# Patient Record
Sex: FEMALE | Race: WHITE | URBAN - METROPOLITAN AREA
[De-identification: names, ages, dates, MRNs, and addresses within clinical notes are randomized per-mention and may not be internally consistent; named-entity substitution may affect disease eponyms.]

---

## 2020-10-29 ENCOUNTER — OFFICE VISIT (OUTPATIENT)
Dept: URBAN - METROPOLITAN AREA CLINIC 76 | Facility: CLINIC | Age: 60
End: 2020-10-29
Payer: COMMERCIAL

## 2020-10-29 DIAGNOSIS — H10.45 OTHER CHRONIC ALLERGIC CONJUNCTIVITIS: ICD-10-CM

## 2020-10-29 DIAGNOSIS — H52.4 PRESBYOPIA: Primary | ICD-10-CM

## 2020-10-29 DIAGNOSIS — H25.13 AGE-RELATED NUCLEAR CATARACT, BILATERAL: ICD-10-CM

## 2020-10-29 PROCEDURE — 92004 COMPRE OPH EXAM NEW PT 1/>: CPT | Performed by: OPTOMETRIST

## 2020-10-29 ASSESSMENT — KERATOMETRY
OS: 45.25
OD: 44.38

## 2020-10-29 ASSESSMENT — VISUAL ACUITY
OD: 20/20
OS: 20/20

## 2020-10-29 ASSESSMENT — INTRAOCULAR PRESSURE
OD: 17
OS: 17

## 2020-10-29 NOTE — IMPRESSION/PLAN
Impression: Open angle with borderline findings, low risk, bilateral: H40.013. Glaucoma suspect due to cupping. Need updated testing VF, OCT Plan: Rediscussed diagnosis with patient.  1-2 month VF, OCT, IOP check OU

## 2020-12-02 ENCOUNTER — OFFICE VISIT (OUTPATIENT)
Dept: URBAN - METROPOLITAN AREA CLINIC 76 | Facility: CLINIC | Age: 60
End: 2020-12-02
Payer: COMMERCIAL

## 2020-12-02 PROCEDURE — 76514 ECHO EXAM OF EYE THICKNESS: CPT | Performed by: OPTOMETRIST

## 2020-12-02 PROCEDURE — 92083 EXTENDED VISUAL FIELD XM: CPT | Performed by: OPTOMETRIST

## 2020-12-02 PROCEDURE — 92133 CPTRZD OPH DX IMG PST SGM ON: CPT | Performed by: OPTOMETRIST

## 2020-12-02 PROCEDURE — 99213 OFFICE O/P EST LOW 20 MIN: CPT | Performed by: OPTOMETRIST

## 2020-12-02 ASSESSMENT — INTRAOCULAR PRESSURE
OS: 17
OD: 18

## 2020-12-02 NOTE — IMPRESSION/PLAN
Impression: Open angle with borderline findings, low risk, bilateral: H40.013. Bilateral. preformed and reviewed VF and OCT OU. VF normal OU. RNFL OCT normal OU. Thick corneas OU. Plan: Discussed with patient. Continue to monitor without treatment. Recommend glaucoma testing every 2 yrs.

## 2021-11-18 ENCOUNTER — OFFICE VISIT (OUTPATIENT)
Dept: URBAN - METROPOLITAN AREA CLINIC 76 | Facility: CLINIC | Age: 61
End: 2021-11-18
Payer: COMMERCIAL

## 2021-11-18 DIAGNOSIS — H40.013 OPEN ANGLE WITH BORDERLINE FINDINGS, LOW RISK, BILATERAL: ICD-10-CM

## 2021-11-18 PROCEDURE — 92014 COMPRE OPH EXAM EST PT 1/>: CPT | Performed by: OPTOMETRIST

## 2021-11-18 ASSESSMENT — KERATOMETRY
OD: 44.38
OS: 44.75

## 2021-11-18 ASSESSMENT — VISUAL ACUITY
OD: 20/20
OS: 20/20

## 2021-11-18 ASSESSMENT — INTRAOCULAR PRESSURE
OD: 22
OS: 19

## 2021-11-18 NOTE — IMPRESSION/PLAN
Impression: Open angle with borderline findings, low risk, bilateral: H40.013. Bilateral. Thick corneas OU. IOP higher OD today. Plan: Rediscussed with patient. Continue to monitor without treatment.  RTC 6 months VF/OCT

## 2021-11-18 NOTE — IMPRESSION/PLAN
Impression: Age-related nuclear cataract, bilateral: H25.13. Bilateral. Plan: Rediscussed condition. Continue to monitor without treatment at this time.

## 2022-05-11 ENCOUNTER — OFFICE VISIT (OUTPATIENT)
Dept: URBAN - METROPOLITAN AREA CLINIC 76 | Facility: CLINIC | Age: 62
End: 2022-05-11
Payer: COMMERCIAL

## 2022-05-11 DIAGNOSIS — H40.013 OPEN ANGLE WITH BORDERLINE FINDINGS, LOW RISK, BILATERAL: Primary | ICD-10-CM

## 2022-05-11 DIAGNOSIS — H25.13 AGE-RELATED NUCLEAR CATARACT, BILATERAL: ICD-10-CM

## 2022-05-11 PROCEDURE — 92133 CPTRZD OPH DX IMG PST SGM ON: CPT | Performed by: OPTOMETRIST

## 2022-05-11 PROCEDURE — 99213 OFFICE O/P EST LOW 20 MIN: CPT | Performed by: OPTOMETRIST

## 2022-05-11 PROCEDURE — 92083 EXTENDED VISUAL FIELD XM: CPT | Performed by: OPTOMETRIST

## 2022-05-11 ASSESSMENT — INTRAOCULAR PRESSURE
OD: 20
OS: 20

## 2023-05-10 ENCOUNTER — OFFICE VISIT (OUTPATIENT)
Dept: URBAN - METROPOLITAN AREA CLINIC 76 | Facility: CLINIC | Age: 63
End: 2023-05-10
Payer: COMMERCIAL

## 2023-05-10 DIAGNOSIS — H52.4 PRESBYOPIA: ICD-10-CM

## 2023-05-10 DIAGNOSIS — H25.13 AGE-RELATED NUCLEAR CATARACT, BILATERAL: ICD-10-CM

## 2023-05-10 DIAGNOSIS — H04.123 DRY EYE SYNDROME OF BILATERAL LACRIMAL GLANDS: ICD-10-CM

## 2023-05-10 DIAGNOSIS — H40.013 OPEN ANGLE WITH BORDERLINE FINDINGS, LOW RISK, BILATERAL: Primary | ICD-10-CM

## 2023-05-10 PROCEDURE — 99214 OFFICE O/P EST MOD 30 MIN: CPT | Performed by: OPTOMETRIST

## 2023-05-10 ASSESSMENT — INTRAOCULAR PRESSURE
OS: 16
OD: 16

## 2023-05-10 ASSESSMENT — KERATOMETRY
OS: 45.25
OD: 44.50

## 2023-05-10 NOTE — IMPRESSION/PLAN
Impression: Open angle with borderline findings, low risk, bilateral: H40.013. Bilateral. Plan: Intraocular pressure well controlled without medications. Will continue with same regimen.   Testing PRN

## 2023-05-10 NOTE — IMPRESSION/PLAN
Impression: Presbyopia: H52.4 Bilateral. Plan: Discussed diagnosis with patient. No need for glasses at this time.

## 2024-06-03 ENCOUNTER — OFFICE VISIT (OUTPATIENT)
Dept: URBAN - METROPOLITAN AREA CLINIC 76 | Facility: CLINIC | Age: 64
End: 2024-06-03
Payer: COMMERCIAL

## 2024-06-03 DIAGNOSIS — H25.13 AGE-RELATED NUCLEAR CATARACT, BILATERAL: ICD-10-CM

## 2024-06-03 DIAGNOSIS — H40.013 OPEN ANGLE WITH BORDERLINE FINDINGS, LOW RISK, BILATERAL: ICD-10-CM

## 2024-06-03 DIAGNOSIS — H52.4 PRESBYOPIA: Primary | ICD-10-CM

## 2024-06-03 PROCEDURE — 92014 COMPRE OPH EXAM EST PT 1/>: CPT | Performed by: OPTOMETRIST

## 2024-06-03 ASSESSMENT — KERATOMETRY
OS: 44.75
OD: 44.38

## 2024-06-03 ASSESSMENT — INTRAOCULAR PRESSURE
OD: 16
OS: 16

## 2024-06-03 ASSESSMENT — VISUAL ACUITY
OD: 20/20
OS: 20/20

## 2025-06-05 ENCOUNTER — OFFICE VISIT (OUTPATIENT)
Dept: URBAN - METROPOLITAN AREA CLINIC 76 | Facility: CLINIC | Age: 65
End: 2025-06-05
Payer: COMMERCIAL

## 2025-06-05 DIAGNOSIS — H40.013 OPEN ANGLE WITH BORDERLINE FINDINGS, LOW RISK, BILATERAL: ICD-10-CM

## 2025-06-05 DIAGNOSIS — H52.4 PRESBYOPIA: Primary | ICD-10-CM

## 2025-06-05 DIAGNOSIS — H25.13 AGE-RELATED NUCLEAR CATARACT, BILATERAL: ICD-10-CM

## 2025-06-05 PROCEDURE — 92012 INTRM OPH EXAM EST PATIENT: CPT | Performed by: OPTOMETRIST

## 2025-06-05 ASSESSMENT — VISUAL ACUITY
OS: 20/20
OD: 20/20

## 2025-06-05 ASSESSMENT — KERATOMETRY
OD: 44.25
OS: 44.88

## 2025-06-05 ASSESSMENT — INTRAOCULAR PRESSURE
OS: 16
OD: 15